# Patient Record
Sex: MALE | Race: WHITE
[De-identification: names, ages, dates, MRNs, and addresses within clinical notes are randomized per-mention and may not be internally consistent; named-entity substitution may affect disease eponyms.]

---

## 2019-09-13 ENCOUNTER — HOSPITAL ENCOUNTER (EMERGENCY)
Dept: HOSPITAL 18 - NAV ERS | Age: 39
Discharge: HOME | End: 2019-09-13
Payer: COMMERCIAL
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
## 2019-09-13 DIAGNOSIS — S92.001A: Primary | ICD-10-CM

## 2019-09-13 DIAGNOSIS — G47.30: ICD-10-CM

## 2019-09-13 PROCEDURE — 29515 APPLICATION SHORT LEG SPLINT: CPT

## 2019-09-13 NOTE — RAD
RIGHT CALCANEUS:

9/13/19

 

History: injury

 

There is a fracture involving the posterior calcaneus. This is nondisplaced. 

 

IMPRESSION: 

Nondisplaced calcaneal fracture. 

 

POS: AGW

## 2019-09-13 NOTE — RAD
RIGHT FOOT THREE VIEWS:

9/13/19

 

HISTORY: 

Fell through a roof five days ago. Complaining of heel pain. 

 

There is a subtle area of lucency involving the posterior most plantar surface of the calcaneus. This
 could indicate a subtle occult fracture. Clinical correlation is whether this specifically relates t
o patient's pain. 

 

IMPRESSION: 

Subtle lucency in the posterior plantar surface of the calcaneus possibly a subtle occult fracture. C
linical correlation. 

 

POS: [facility]